# Patient Record
Sex: FEMALE | Race: WHITE | NOT HISPANIC OR LATINO | Employment: FULL TIME | URBAN - METROPOLITAN AREA
[De-identification: names, ages, dates, MRNs, and addresses within clinical notes are randomized per-mention and may not be internally consistent; named-entity substitution may affect disease eponyms.]

---

## 2018-10-24 ENCOUNTER — OFFICE VISIT (OUTPATIENT)
Dept: FAMILY MEDICINE CLINIC | Facility: CLINIC | Age: 23
End: 2018-10-24

## 2018-10-24 VITALS
HEART RATE: 99 BPM | OXYGEN SATURATION: 98 % | TEMPERATURE: 97.6 F | SYSTOLIC BLOOD PRESSURE: 116 MMHG | WEIGHT: 161.8 LBS | HEIGHT: 66 IN | RESPIRATION RATE: 20 BRPM | DIASTOLIC BLOOD PRESSURE: 64 MMHG | BODY MASS INDEX: 26 KG/M2

## 2018-10-24 DIAGNOSIS — R25.1 TREMOR: Primary | ICD-10-CM

## 2018-10-24 PROCEDURE — 99202 OFFICE O/P NEW SF 15 MIN: CPT | Performed by: FAMILY MEDICINE

## 2018-10-24 RX ORDER — CYCLOBENZAPRINE HCL 10 MG
TABLET ORAL
Refills: 0 | COMMUNITY
Start: 2018-07-23 | End: 2018-10-24

## 2018-10-24 RX ORDER — ETONOGESTREL/ETHINYL ESTRADIOL .12-.015MG
RING, VAGINAL VAGINAL
Refills: 11 | COMMUNITY
Start: 2018-08-09 | End: 2018-10-24

## 2018-10-24 NOTE — PROGRESS NOTES
"Denise Kitchen is a 23 y.o. female.     History of Present Illness   The patient is here today with c/o tremor of both hands.    States she  First noticed it about a 4 to 5 years ago. Has worsened over the past year and noticed in the past 2 weeks it has gotten much worse. Having difficulty with writing.  Denies any chest pain or shortness of breath.    Also c/o episodic joint pain in elbows and knees. Described as dull ache      The following portions of the patient's history were reviewed and updated as appropriate: allergies, current medications, past social history and problem list.    Review of Systems   Respiratory: Negative for shortness of breath.    Cardiovascular: Negative for chest pain.   Gastrointestinal: Negative for constipation, diarrhea and nausea.   Musculoskeletal: Positive for arthralgias (knees and elbows).   Neurological: Positive for tremors.       Objective   /64   Pulse 99   Temp 97.6 °F (36.4 °C) (Temporal Artery )   Resp 20   Ht 167.6 cm (66\")   Wt 73.4 kg (161 lb 12.8 oz)   SpO2 98%   BMI 26.12 kg/m²   Physical Exam   Constitutional: She is oriented to person, place, and time. She appears well-developed and well-nourished.   HENT:   Mouth/Throat: Oropharynx is clear and moist.   Eyes: Pupils are equal, round, and reactive to light. Conjunctivae and EOM are normal.   Neck: Normal range of motion. Neck supple. No thyromegaly present.   Cardiovascular: Normal rate and regular rhythm.    Pulmonary/Chest: Effort normal and breath sounds normal.   Neurological: She is alert and oriented to person, place, and time. She displays normal reflexes. No cranial nerve deficit or sensory deficit. She exhibits normal muscle tone. Coordination normal.   Neurologic examination reveals a coarse tremor of both upper extremities.  There is also some very mild tremor of the feet of both lower extremities.  There is some transient and mild cogwheel rigidity noted in the right upper " extremity.  The patient's coordination is normal.  mental status exam is entirely normal.   Nursing note and vitals reviewed.      Assessment/Plan   Problem List Items Addressed This Visit     None      Visit Diagnoses     Tremor    -  Primary    Relevant Orders    Ambulatory Referral to Neurology (Completed)      The patient has a tremor of all 4 extremities that has been present for a few years and recently has grown worse.  The tremor is not affected by intention.  It has begun to interfere with her activities of daily living.  There is no family history of tremor in the patient observes a low caffeine diet.  Physical examination does not suggest hyperthyroidism.  On neuromuscular testing on one occasion I thought I detected some cogwheel rigidity in the right upper extremity.  She is having no other neurologic symptoms.  We will go ahead and refer to neurology.  Check some routine laboratory studies.        Drink plenty fluids.    Check a CBC,CMP,Free T 4 and TSH. Report results by letter.    Refer to Neurology. Prefers Dr Rudi Pollard.    Follow up as needed.              Scribed for Dr Alirio Lanza by Sridevi Islas CMA.          I, Alirio Lanza MD, personally performed the services described in this documentation, as scribed by Sridevi Islas in my presence, and is both accurate and complete.

## 2018-11-26 ENCOUNTER — OFFICE VISIT (OUTPATIENT)
Dept: NEUROLOGY | Facility: CLINIC | Age: 23
End: 2018-11-26

## 2018-11-26 VITALS
HEART RATE: 79 BPM | DIASTOLIC BLOOD PRESSURE: 74 MMHG | SYSTOLIC BLOOD PRESSURE: 116 MMHG | BODY MASS INDEX: 25.88 KG/M2 | HEIGHT: 66 IN | WEIGHT: 161 LBS | OXYGEN SATURATION: 98 %

## 2018-11-26 DIAGNOSIS — R25.1 OCCASIONAL TREMORS: Primary | ICD-10-CM

## 2018-11-26 PROBLEM — F41.9 ANXIETY: Status: ACTIVE | Noted: 2018-11-26

## 2018-11-26 PROCEDURE — 99204 OFFICE O/P NEW MOD 45 MIN: CPT | Performed by: PSYCHIATRY & NEUROLOGY

## 2018-11-26 RX ORDER — PROPRANOLOL HCL 60 MG
60 CAPSULE, EXTENDED RELEASE 24HR ORAL DAILY
Qty: 30 CAPSULE | Refills: 5 | Status: SHIPPED | OUTPATIENT
Start: 2018-11-26 | End: 2020-02-03

## 2018-11-26 NOTE — PROGRESS NOTES
Subjective:    CC: Jessica Kitchen is seen today in consultation at the request of Alirio Lanza MD for Tremors       HPI:  23 year old female accompanied by her mother with a history of anxiety now presents with tremors.  As per patient she has had tremors in her hands for about 7 years now but they have been worsening recently.  She would initially have them at rest but in the last few years they are also present on carrying out tasks such as reaching out for objects and writing.  She is an anxious person and she feels that stress and anxiety seems to make the tremors worse.  As per her mother the tremors completely subside when the patient is relaxed.  There is no family history of tremors other than her grandmother's sister having Parkinson's disease.  She also denies having any other symptoms.    Of note- I reviewed her PCPs note.    The following portions of the patient's history were reviewed today and updated as of 11/26/2018  : allergies, current medications, past family history, past medical history, past social history, past surgical history and problem list  These document will be scanned to patient's chart.      Current Outpatient Medications:   •  levonorgestrel (KYLEENA) 19.5 MG intrauterine device IUD, 1 each by Intrauterine route 1 (One) Time., Disp: , Rfl:   •  propranolol LA (INDERAL LA) 60 MG 24 hr capsule, Take 1 capsule by mouth Daily., Disp: 30 capsule, Rfl: 5   Past Medical History:   Diagnosis Date   • Occasional tremors 11/26/2018      Past Surgical History:   Procedure Laterality Date   • ADENOIDECTOMY     • TONSILLECTOMY     • WISDOM TOOTH EXTRACTION        Family History   Problem Relation Age of Onset   • No Known Problems Mother    • No Known Problems Father       Social History     Socioeconomic History   • Marital status: Single     Spouse name: Not on file   • Number of children: Not on file   • Years of education: Not on file   • Highest education level: Not on file   Social  "Needs   • Financial resource strain: Not on file   • Food insecurity - worry: Not on file   • Food insecurity - inability: Not on file   • Transportation needs - medical: Not on file   • Transportation needs - non-medical: Not on file   Occupational History   • Not on file   Tobacco Use   • Smoking status: Never Smoker   • Smokeless tobacco: Never Used   Substance and Sexual Activity   • Alcohol use: No     Frequency: Never   • Drug use: No   • Sexual activity: Defer   Other Topics Concern   • Not on file   Social History Narrative   • Not on file     Review of Systems   Constitutional: Positive for appetite change, fatigue and unexpected weight loss.   Respiratory: Positive for shortness of breath.    Gastrointestinal: Positive for diarrhea and nausea.   Neurological: Positive for dizziness and tremors.   Psychiatric/Behavioral: Positive for agitation. The patient is nervous/anxious.    All other systems reviewed and are negative.      Objective:    /74 (BP Location: Right arm, Patient Position: Sitting, Cuff Size: Adult)   Pulse 79   Ht 167.6 cm (66\")   Wt 73 kg (161 lb)   SpO2 98%   BMI 25.99 kg/m²     Neurology Exam:    General apperance: NAD.     Mental status: Alert, awake and oriented to time place and person.    Recent and Remote memory: Intact.    Attention span and Concentration: Normal.     Language and Speech: Intact- No dysarthria.    Fluency, Naming , Repitition and Comprehension:  Intact    Cranial Nerves:   CN II: Visual fields are full. Intact. Fundi - Normal, No papillederma, Pupils - GONZALEZ  CN III, IV and VI: Extraocular movements are intact. Normal saccades.   CN V: Facial sensation is intact.   CN VII: Muscles of facial expression reveal no asymmetry. Intact.   CN VIII: Hearing is intact. Whispered voice intact.   CN IX and X: Palate elevates symmetrically. Intact  CN XI: Shoulder shrug is intact.   CN XII: Tongue is midline without evidence of atrophy or fasciculation. "     Ophthalmoscopic exam of optic disc-normal    Motor:-Fine postural more than resting tremors in both hands  Right UE muscle strength 5/5. Normal tone.     Left UE muscle strength 5/5.  Mild cogwheel rigidity noted     Right LE muscle strength5/5. Normal tone.     Left LE muscle strength 5/5. Normal tone.      Sensory: Normal light touch, vibration and pinprick sensation bilaterally.    DTRs: 2+ bilaterally in upper and lower extremities.    Babinski: Negative bilaterally.    Co-ordination: Normal finger-to-nose, heel to shin B/L.    Rhomberg: Negative.    Gait: Normal.  Could do tandem walking    Cardiovascular: Regular rate and rhythm without murmur, gallop or rub.    Assessment and Plan:  1. Occasional tremors  I feel she may have either physiological tremors worsened by anxiety or essential tremors.  I will start her on Inderal LA 60 mg at night.  For her anxiety I have asked her to speak to her PCP about starting her on low dose antidepressants.    - T4, Free; Future  - TSH; Future  - Vitamin B12; Future  - Copper, Serum; Future       Return in about 6 weeks (around 1/7/2019).         Thais Cano MD

## 2018-11-28 LAB
COPPER SERPL-MCNC: 85 UG/DL (ref 72–166)
T4 FREE SERPL-MCNC: 1.26 NG/DL (ref 0.82–1.77)
TSH SERPL DL<=0.005 MIU/L-ACNC: 1.1 UIU/ML (ref 0.45–4.5)
VIT B12 SERPL-MCNC: 349 PG/ML (ref 232–1245)

## 2018-12-03 ENCOUNTER — TELEPHONE (OUTPATIENT)
Dept: NEUROLOGY | Facility: CLINIC | Age: 23
End: 2018-12-03

## 2018-12-03 NOTE — TELEPHONE ENCOUNTER
----- Message from Thais Cano MD sent at 11/30/2018  5:31 PM EST -----  Can you tell her that all her labs were normal.  B12 was on the lower limit and she can take B12 1000 µg daily

## 2018-12-06 NOTE — TELEPHONE ENCOUNTER
Spoke with patient and advised her of lab results. Patient stated that she had no questions at this time

## 2019-07-30 ENCOUNTER — HOSPITAL ENCOUNTER (EMERGENCY)
Facility: HOSPITAL | Age: 24
Discharge: HOME | End: 2019-07-30
Attending: EMERGENCY MEDICINE | Admitting: EMERGENCY MEDICINE

## 2019-07-30 ENCOUNTER — APPOINTMENT (OUTPATIENT)
Dept: GENERAL RADIOLOGY | Facility: HOSPITAL | Age: 24
End: 2019-07-30

## 2019-07-30 VITALS
DIASTOLIC BLOOD PRESSURE: 77 MMHG | HEIGHT: 66 IN | BODY MASS INDEX: 24.11 KG/M2 | HEART RATE: 100 BPM | RESPIRATION RATE: 20 BRPM | TEMPERATURE: 98.5 F | SYSTOLIC BLOOD PRESSURE: 118 MMHG | WEIGHT: 150 LBS | OXYGEN SATURATION: 98 %

## 2019-07-30 DIAGNOSIS — R04.0 EPISTAXIS: Primary | ICD-10-CM

## 2019-07-30 PROCEDURE — 99283 EMERGENCY DEPT VISIT LOW MDM: CPT

## 2019-07-30 RX ORDER — VENLAFAXINE 37.5 MG/1
37.5 TABLET ORAL DAILY
COMMUNITY

## 2020-02-03 RX ORDER — PROPRANOLOL HCL 60 MG
CAPSULE, EXTENDED RELEASE 24HR ORAL
Qty: 30 CAPSULE | Refills: 4 | Status: SHIPPED | OUTPATIENT
Start: 2020-02-03 | End: 2020-03-11

## 2020-03-11 ENCOUNTER — OFFICE VISIT (OUTPATIENT)
Dept: FAMILY MEDICINE CLINIC | Facility: CLINIC | Age: 25
End: 2020-03-11

## 2020-03-11 VITALS
SYSTOLIC BLOOD PRESSURE: 110 MMHG | OXYGEN SATURATION: 96 % | BODY MASS INDEX: 26.52 KG/M2 | HEART RATE: 90 BPM | RESPIRATION RATE: 19 BRPM | TEMPERATURE: 97.9 F | DIASTOLIC BLOOD PRESSURE: 80 MMHG | HEIGHT: 66 IN | WEIGHT: 165 LBS

## 2020-03-11 DIAGNOSIS — J30.1 SEASONAL ALLERGIC RHINITIS DUE TO POLLEN: ICD-10-CM

## 2020-03-11 DIAGNOSIS — J01.00 ACUTE MAXILLARY SINUSITIS, RECURRENCE NOT SPECIFIED: Primary | ICD-10-CM

## 2020-03-11 PROCEDURE — 99213 OFFICE O/P EST LOW 20 MIN: CPT | Performed by: FAMILY MEDICINE

## 2020-03-11 RX ORDER — FLUTICASONE PROPIONATE 50 MCG
SPRAY, SUSPENSION (ML) NASAL
Qty: 1 BOTTLE | Refills: 5 | Status: SHIPPED | OUTPATIENT
Start: 2020-03-11

## 2020-03-11 RX ORDER — CEFUROXIME AXETIL 250 MG/1
250 TABLET ORAL 2 TIMES DAILY
Qty: 20 TABLET | Refills: 0 | Status: SHIPPED | OUTPATIENT
Start: 2020-03-11 | End: 2020-05-26

## 2020-03-11 RX ORDER — CETIRIZINE HYDROCHLORIDE 10 MG/1
10 TABLET ORAL DAILY
Qty: 30 TABLET | Refills: 2 | Status: SHIPPED | OUTPATIENT
Start: 2020-03-11

## 2020-03-11 NOTE — PROGRESS NOTES
"Denise Kitchen is a 24 y.o. female seen today for URI.     Cough   This is a new problem. The current episode started in the past 7 days. The cough is productive of sputum. Associated symptoms include postnasal drip. Pertinent negatives include no chest pain, chills, fever, shortness of breath or wheezing. Nothing aggravates the symptoms.        The following portions of the patient's history were reviewed and updated as appropriate: allergies, current medications, past social history and problem list.    Review of Systems   Constitutional: Negative for chills and fever.   HENT: Positive for postnasal drip, sinus pressure and sinus pain.    Respiratory: Positive for cough. Negative for shortness of breath and wheezing.    Cardiovascular: Negative for chest pain.       Objective   /80   Pulse 90   Temp 97.9 °F (36.6 °C)   Resp 19   Ht 167.6 cm (66\")   Wt 74.8 kg (165 lb)   SpO2 96%   BMI 26.63 kg/m²   Physical Exam   Constitutional: She appears well-developed and well-nourished.   HENT:   Right Ear: External ear normal.   Left Ear: External ear normal.   Nose: Right sinus exhibits maxillary sinus tenderness. Right sinus exhibits no frontal sinus tenderness. Left sinus exhibits maxillary sinus tenderness. Left sinus exhibits no frontal sinus tenderness.   Mouth/Throat: Oropharynx is clear and moist.   Cardiovascular: Normal rate and regular rhythm.   Pulmonary/Chest: Effort normal and breath sounds normal.   Nursing note and vitals reviewed.      Assessment/Plan   Problem List Items Addressed This Visit     None      Visit Diagnoses     Acute maxillary sinusitis, recurrence not specified    -  Primary    Seasonal allergic rhinitis due to pollen                  Drink plenty fluids.    Cefuroxime 250 mg twice a day #20+0.  Rx for Fluticasone nasal spray 2 sprays in each nostril daily.#1+5  Rx for Cetirizine 10 mg daily #30+2.    Follow up as needed.                Scribed for Dr Alirio Lanza " by Sridevi Islas CMA.          I, Alirio Lanza MD, personally performed the services described in this documentation, as scribed by Sridevi Islas in my presence, and is both accurate and complete.        (Please note that portions of this note were completed with a voice recognition program. Efforts were made to edit the dictations,but occasionally words are mis transcribed.)

## 2020-05-25 ENCOUNTER — HOSPITAL ENCOUNTER (EMERGENCY)
Facility: HOSPITAL | Age: 25
Discharge: LEFT BEFORE DISCHARGE | End: 2020-05-25

## 2020-05-25 VITALS
TEMPERATURE: 97.6 F | RESPIRATION RATE: 16 BRPM | HEIGHT: 66 IN | DIASTOLIC BLOOD PRESSURE: 92 MMHG | WEIGHT: 155 LBS | OXYGEN SATURATION: 98 % | SYSTOLIC BLOOD PRESSURE: 129 MMHG | BODY MASS INDEX: 24.91 KG/M2 | HEART RATE: 112 BPM

## 2020-05-26 ENCOUNTER — APPOINTMENT (OUTPATIENT)
Dept: CT IMAGING | Facility: HOSPITAL | Age: 25
End: 2020-05-26

## 2020-05-26 ENCOUNTER — HOSPITAL ENCOUNTER (EMERGENCY)
Facility: HOSPITAL | Age: 25
Discharge: HOME | End: 2020-05-26
Attending: EMERGENCY MEDICINE | Admitting: EMERGENCY MEDICINE

## 2020-05-26 VITALS
HEART RATE: 96 BPM | RESPIRATION RATE: 18 BRPM | DIASTOLIC BLOOD PRESSURE: 82 MMHG | OXYGEN SATURATION: 95 % | HEIGHT: 66 IN | WEIGHT: 155 LBS | BODY MASS INDEX: 24.91 KG/M2 | TEMPERATURE: 98.2 F | SYSTOLIC BLOOD PRESSURE: 114 MMHG

## 2020-05-26 DIAGNOSIS — S09.90XA MINOR HEAD INJURY, INITIAL ENCOUNTER: Primary | ICD-10-CM

## 2020-05-26 LAB
ALBUMIN SERPL-MCNC: 4.9 G/DL (ref 3.5–5.2)
ALBUMIN/GLOB SERPL: 2.5 G/DL
ALP SERPL-CCNC: 65 U/L (ref 39–117)
ALT SERPL W P-5'-P-CCNC: 14 U/L (ref 1–33)
ANION GAP SERPL CALCULATED.3IONS-SCNC: 12 MMOL/L (ref 5–15)
AST SERPL-CCNC: 18 U/L (ref 1–32)
B-HCG UR QL: NEGATIVE
BACTERIA UR QL AUTO: ABNORMAL /HPF
BASOPHILS # BLD AUTO: 0.02 10*3/MM3 (ref 0–0.2)
BASOPHILS NFR BLD AUTO: 0.5 % (ref 0–1.5)
BILIRUB SERPL-MCNC: 0.7 MG/DL (ref 0.2–1.2)
BILIRUB UR QL STRIP: NEGATIVE
BUN BLD-MCNC: 6 MG/DL (ref 6–20)
BUN/CREAT SERPL: 8.5 (ref 7–25)
CALCIUM SPEC-SCNC: 9.3 MG/DL (ref 8.6–10.5)
CHLORIDE SERPL-SCNC: 103 MMOL/L (ref 98–107)
CLARITY UR: ABNORMAL
CO2 SERPL-SCNC: 26 MMOL/L (ref 22–29)
COLOR UR: YELLOW
CREAT BLD-MCNC: 0.71 MG/DL (ref 0.57–1)
DEPRECATED RDW RBC AUTO: 41.3 FL (ref 37–54)
EOSINOPHIL # BLD AUTO: 0.04 10*3/MM3 (ref 0–0.4)
EOSINOPHIL NFR BLD AUTO: 1 % (ref 0.3–6.2)
ERYTHROCYTE [DISTWIDTH] IN BLOOD BY AUTOMATED COUNT: 11.6 % (ref 12.3–15.4)
GFR SERPL CREATININE-BSD FRML MDRD: 102 ML/MIN/1.73
GLOBULIN UR ELPH-MCNC: 2 GM/DL
GLUCOSE BLD-MCNC: 88 MG/DL (ref 65–99)
GLUCOSE UR STRIP-MCNC: NEGATIVE MG/DL
HCT VFR BLD AUTO: 45.6 % (ref 34–46.6)
HGB BLD-MCNC: 15.5 G/DL (ref 12–15.9)
HGB UR QL STRIP.AUTO: ABNORMAL
HYALINE CASTS UR QL AUTO: ABNORMAL /LPF
IMM GRANULOCYTES # BLD AUTO: 0.01 10*3/MM3 (ref 0–0.05)
IMM GRANULOCYTES NFR BLD AUTO: 0.3 % (ref 0–0.5)
KETONES UR QL STRIP: NEGATIVE
LEUKOCYTE ESTERASE UR QL STRIP.AUTO: ABNORMAL
LYMPHOCYTES # BLD AUTO: 1.24 10*3/MM3 (ref 0.7–3.1)
LYMPHOCYTES NFR BLD AUTO: 31.1 % (ref 19.6–45.3)
MCH RBC QN AUTO: 33.1 PG (ref 26.6–33)
MCHC RBC AUTO-ENTMCNC: 34 G/DL (ref 31.5–35.7)
MCV RBC AUTO: 97.4 FL (ref 79–97)
MONOCYTES # BLD AUTO: 0.29 10*3/MM3 (ref 0.1–0.9)
MONOCYTES NFR BLD AUTO: 7.3 % (ref 5–12)
NEUTROPHILS # BLD AUTO: 2.39 10*3/MM3 (ref 1.7–7)
NEUTROPHILS NFR BLD AUTO: 59.8 % (ref 42.7–76)
NITRITE UR QL STRIP: NEGATIVE
NRBC BLD AUTO-RTO: 0 /100 WBC (ref 0–0.2)
PH UR STRIP.AUTO: 7.5 [PH] (ref 5–8)
PLATELET # BLD AUTO: 224 10*3/MM3 (ref 140–450)
PMV BLD AUTO: 9.9 FL (ref 6–12)
POTASSIUM BLD-SCNC: 3.8 MMOL/L (ref 3.5–5.2)
PROT SERPL-MCNC: 6.9 G/DL (ref 6–8.5)
PROT UR QL STRIP: NEGATIVE
RBC # BLD AUTO: 4.68 10*6/MM3 (ref 3.77–5.28)
RBC # UR: ABNORMAL /HPF
REF LAB TEST METHOD: ABNORMAL
SODIUM BLD-SCNC: 141 MMOL/L (ref 136–145)
SP GR UR STRIP: 1.01 (ref 1–1.03)
SQUAMOUS #/AREA URNS HPF: ABNORMAL /HPF
TROPONIN T SERPL-MCNC: <0.01 NG/ML (ref 0–0.03)
UROBILINOGEN UR QL STRIP: ABNORMAL
WBC NRBC COR # BLD: 3.99 10*3/MM3 (ref 3.4–10.8)
WBC UR QL AUTO: ABNORMAL /HPF
YEAST URNS QL MICRO: ABNORMAL /HPF

## 2020-05-26 PROCEDURE — 70450 CT HEAD/BRAIN W/O DYE: CPT

## 2020-05-26 PROCEDURE — 80053 COMPREHEN METABOLIC PANEL: CPT | Performed by: NURSE PRACTITIONER

## 2020-05-26 PROCEDURE — 81025 URINE PREGNANCY TEST: CPT | Performed by: NURSE PRACTITIONER

## 2020-05-26 PROCEDURE — 85025 COMPLETE CBC W/AUTO DIFF WBC: CPT | Performed by: NURSE PRACTITIONER

## 2020-05-26 PROCEDURE — 87086 URINE CULTURE/COLONY COUNT: CPT | Performed by: NURSE PRACTITIONER

## 2020-05-26 PROCEDURE — 99284 EMERGENCY DEPT VISIT MOD MDM: CPT

## 2020-05-26 PROCEDURE — 93005 ELECTROCARDIOGRAM TRACING: CPT | Performed by: NURSE PRACTITIONER

## 2020-05-26 PROCEDURE — 81001 URINALYSIS AUTO W/SCOPE: CPT | Performed by: NURSE PRACTITIONER

## 2020-05-26 PROCEDURE — 84484 ASSAY OF TROPONIN QUANT: CPT | Performed by: NURSE PRACTITIONER

## 2020-05-26 RX ORDER — SODIUM CHLORIDE 0.9 % (FLUSH) 0.9 %
10 SYRINGE (ML) INJECTION AS NEEDED
Status: DISCONTINUED | OUTPATIENT
Start: 2020-05-26 | End: 2020-05-26 | Stop reason: HOSPADM

## 2020-05-26 RX ORDER — MELATONIN
1000 DAILY
COMMUNITY

## 2020-05-26 RX ORDER — BUSPIRONE HYDROCHLORIDE 5 MG/1
5 TABLET ORAL 2 TIMES DAILY
COMMUNITY

## 2020-05-26 RX ORDER — CLONAZEPAM 0.5 MG/1
0.5 TABLET ORAL 2 TIMES DAILY PRN
COMMUNITY

## 2020-05-26 RX ADMIN — SODIUM CHLORIDE 1000 ML: 9 INJECTION, SOLUTION INTRAVENOUS at 11:54

## 2020-05-28 LAB — BACTERIA SPEC AEROBE CULT: NO GROWTH

## 2020-09-10 ENCOUNTER — TELEPHONE (OUTPATIENT)
Dept: PEDIATRICS | Facility: OTHER | Age: 25
End: 2020-09-10

## 2020-09-10 ENCOUNTER — OFFICE VISIT (OUTPATIENT)
Dept: FAMILY MEDICINE CLINIC | Facility: CLINIC | Age: 25
End: 2020-09-10

## 2020-09-10 VITALS
RESPIRATION RATE: 16 BRPM | WEIGHT: 151 LBS | DIASTOLIC BLOOD PRESSURE: 68 MMHG | HEART RATE: 100 BPM | SYSTOLIC BLOOD PRESSURE: 110 MMHG | TEMPERATURE: 98.2 F | HEIGHT: 66 IN | BODY MASS INDEX: 24.27 KG/M2 | OXYGEN SATURATION: 99 %

## 2020-09-10 DIAGNOSIS — J02.9 PHARYNGITIS, UNSPECIFIED ETIOLOGY: Primary | ICD-10-CM

## 2020-09-10 DIAGNOSIS — M62.838 MUSCLE SPASM: ICD-10-CM

## 2020-09-10 PROCEDURE — 86664 EPSTEIN-BARR NUCLEAR ANTIGEN: CPT | Performed by: NURSE PRACTITIONER

## 2020-09-10 PROCEDURE — 99213 OFFICE O/P EST LOW 20 MIN: CPT | Performed by: NURSE PRACTITIONER

## 2020-09-10 PROCEDURE — 85025 COMPLETE CBC W/AUTO DIFF WBC: CPT | Performed by: NURSE PRACTITIONER

## 2020-09-10 PROCEDURE — 86665 EPSTEIN-BARR CAPSID VCA: CPT | Performed by: NURSE PRACTITIONER

## 2020-09-10 PROCEDURE — 85007 BL SMEAR W/DIFF WBC COUNT: CPT | Performed by: NURSE PRACTITIONER

## 2020-09-10 PROCEDURE — 80053 COMPREHEN METABOLIC PANEL: CPT | Performed by: NURSE PRACTITIONER

## 2020-09-10 RX ORDER — CYCLOBENZAPRINE HCL 10 MG
10 TABLET ORAL 3 TIMES DAILY PRN
Qty: 60 TABLET | Refills: 0 | Status: SHIPPED | OUTPATIENT
Start: 2020-09-10 | End: 2021-02-04

## 2020-09-10 RX ORDER — AZITHROMYCIN 250 MG/1
TABLET, FILM COATED ORAL
Qty: 6 TABLET | Refills: 0 | Status: SHIPPED | OUTPATIENT
Start: 2020-09-10 | End: 2020-09-14

## 2020-09-10 NOTE — PROGRESS NOTES
Denise Kitchen is a 25 y.o. female.     History of Present Illness  9/9/20 tested neg for covid.  9/6/20 started with bilat ear pain, sore throat, neck tightness, fever, myalgia. No other tests were performed. Fever up to 100.1. Last temp was 9/9/20. Slight sore throat, doesn't hurt to swallow but neck feels tight. No wheezing or trouble getting a breath.  Has had strep in the past and was worse than this. Has had these symptoms in the past and though to be a virus. Was exposed to covid positive person Aug 30th. Was re-tested for covid today. Results pending.    Outpatient Encounter Medications as of 9/10/2020   Medication Sig Dispense Refill   • busPIRone (BUSPAR) 5 MG tablet Take 5 mg by mouth 2 (Two) Times a Day.     • cetirizine (zyrTEC) 10 MG tablet Take 1 tablet by mouth Daily. 30 tablet 2   • cholecalciferol (VITAMIN D3) 25 MCG (1000 UT) tablet Take 1,000 Units by mouth Daily.     • clonazePAM (KlonoPIN) 0.5 MG tablet Take 0.5 mg by mouth 2 (Two) Times a Day As Needed for Seizures.     • fluticasone (FLONASE) 50 MCG/ACT nasal spray Administer 2 sprays in each nostril daily. 1 bottle 5   • levonorgestrel (KYLEENA) 19.5 MG intrauterine device IUD 1 each by Intrauterine route 1 (One) Time.     • venlafaxine (EFFEXOR) 37.5 MG tablet Take 37.5 mg by mouth Daily.       No facility-administered encounter medications on file as of 9/10/2020.        The following portions of the patient's history were reviewed and updated as appropriate: allergies, current medications, past family history, past medical history, past social history, past surgical history and problem list.    Review of Systems   Constitutional: Positive for fever. Negative for appetite change, unexpected weight gain and unexpected weight loss.   HENT: Positive for ear pain and sore throat. Negative for congestion, nosebleeds and trouble swallowing.    Eyes: Negative for visual disturbance.   Respiratory: Negative for cough, shortness of  "breath and wheezing.    Cardiovascular: Negative for chest pain, palpitations and leg swelling.   Gastrointestinal: Negative for abdominal pain, blood in stool, constipation, diarrhea, nausea and vomiting.   Endocrine: Negative for polydipsia, polyphagia and polyuria.   Genitourinary: Negative for dysuria, frequency and hematuria.   Musculoskeletal: Positive for myalgias and neck pain. Negative for arthralgias and joint swelling.   Skin: Negative for rash.   Neurological: Negative for dizziness, seizures, syncope and numbness.   Hematological: Negative for adenopathy. Does not bruise/bleed easily.   Psychiatric/Behavioral: Negative for behavioral problems, sleep disturbance and depressed mood. The patient is not nervous/anxious.        Objective     Visit Vitals  /100   Pulse 100   Temp 98.2 °F (36.8 °C)   Resp 16   Ht 167.6 cm (66\")   Wt 68.5 kg (151 lb)   LMP  (Within Weeks)   SpO2 99%   BMI 24.37 kg/m²       Physical Exam   Constitutional: She is oriented to person, place, and time. She appears well-developed and well-nourished. No distress.   HENT:   Head: Normocephalic and atraumatic.   Right Ear: Tympanic membrane and external ear normal.   Left Ear: Tympanic membrane and external ear normal.   Eyes: Pupils are equal, round, and reactive to light. Conjunctivae are normal. Right eye exhibits no discharge. Left eye exhibits no discharge. No scleral icterus.   Neck: Normal range of motion. Neck supple. No JVD present. No tracheal deviation present. No thyromegaly present.   Cardiovascular: Normal rate, regular rhythm and normal heart sounds. Exam reveals no gallop and no friction rub.   No murmur heard.  Pulmonary/Chest: Effort normal and breath sounds normal. No respiratory distress. She has no wheezes.   Abdominal: Soft. Bowel sounds are normal. She exhibits no distension and no mass. There is no tenderness.   Musculoskeletal: She exhibits tenderness. She exhibits no edema or deformity.   Traps are tight " bilat   Lymphadenopathy:     She has no cervical adenopathy.   Neurological: She is alert and oriented to person, place, and time. Coordination normal.   Skin: Skin is warm and dry. Capillary refill takes less than 2 seconds. No rash noted. No erythema.   Psychiatric: She has a normal mood and affect. Her speech is normal and behavior is normal. Judgment and thought content normal.   Nursing note and vitals reviewed.        Assessment/Plan   Jessica was seen today for earache.    Diagnoses and all orders for this visit:    Pharyngitis, unspecified etiology  -     azithromycin (ZITHROMAX) 250 MG tablet; Take 2 tablets the first day, then 1 tablet daily for 4 days.  -     CBC & Differential  -     Comprehensive Metabolic Panel  -     EBV Antibody Profile  -     CBC Auto Differential    Muscle spasm  -     cyclobenzaprine (FLEXERIL) 10 MG tablet; Take 1 tablet by mouth 3 (Three) Times a Day As Needed for Muscle Spasms.    We discussed her options.  She has low grade fever < 100.4, her neck feels tight, not necessarily her throat. She has no signs of meningitis. She is not drooling or having dysphagia.  Discussed she would need to go to [facility] for rss and flu test but she doesn't want to go there due to large copay.  Will go ahead and treat with z-pack and check labs.  This is likely viral. Discussed that if she gets fever> 100.4, cough, worsening sore throat she needs to go to ER/UTC.   She verbalized understanding.

## 2020-09-11 ENCOUNTER — TELEPHONE (OUTPATIENT)
Dept: FAMILY MEDICINE CLINIC | Facility: CLINIC | Age: 25
End: 2020-09-11

## 2020-09-11 ENCOUNTER — LAB (OUTPATIENT)
Dept: FAMILY MEDICINE CLINIC | Facility: CLINIC | Age: 25
End: 2020-09-11

## 2020-09-11 DIAGNOSIS — D72.819 LEUKOPENIA, UNSPECIFIED TYPE: Primary | ICD-10-CM

## 2020-09-11 DIAGNOSIS — R74.8 ELEVATED LIVER ENZYMES: ICD-10-CM

## 2020-09-11 DIAGNOSIS — D72.819 LEUKOPENIA, UNSPECIFIED TYPE: ICD-10-CM

## 2020-09-11 DIAGNOSIS — R06.02 SHORTNESS OF BREATH: ICD-10-CM

## 2020-09-11 DIAGNOSIS — M54.2 NECK PAIN: ICD-10-CM

## 2020-09-11 LAB
ALBUMIN SERPL-MCNC: 4.4 G/DL (ref 3.5–5.2)
ALBUMIN/GLOB SERPL: 2.2 G/DL
ALP SERPL-CCNC: 154 U/L (ref 39–117)
ALT SERPL W P-5'-P-CCNC: 210 U/L (ref 1–33)
ANION GAP SERPL CALCULATED.3IONS-SCNC: 10.7 MMOL/L (ref 5–15)
AST SERPL-CCNC: 169 U/L (ref 1–32)
BILIRUB SERPL-MCNC: 0.6 MG/DL (ref 0–1.2)
BUN SERPL-MCNC: 6 MG/DL (ref 6–20)
BUN/CREAT SERPL: 10.5 (ref 7–25)
CALCIUM SPEC-SCNC: 9.3 MG/DL (ref 8.6–10.5)
CHLORIDE SERPL-SCNC: 103 MMOL/L (ref 98–107)
CO2 SERPL-SCNC: 22.3 MMOL/L (ref 22–29)
CREAT SERPL-MCNC: 0.57 MG/DL (ref 0.57–1)
DEPRECATED RDW RBC AUTO: 42.9 FL (ref 37–54)
ERYTHROCYTE [DISTWIDTH] IN BLOOD BY AUTOMATED COUNT: 11.6 % (ref 12.3–15.4)
GFR SERPL CREATININE-BSD FRML MDRD: 130 ML/MIN/1.73
GLOBULIN UR ELPH-MCNC: 2 GM/DL
GLUCOSE SERPL-MCNC: 96 MG/DL (ref 65–99)
HCT VFR BLD AUTO: 43.5 % (ref 34–46.6)
HGB BLD-MCNC: 15.2 G/DL (ref 12–15.9)
HOLD SPECIMEN: NORMAL
LYMPHOCYTES # BLD MANUAL: 0.49 10*3/MM3 (ref 0.7–3.1)
LYMPHOCYTES NFR BLD MANUAL: 27.3 % (ref 19.6–45.3)
LYMPHOCYTES NFR BLD MANUAL: 3 % (ref 5–12)
MCH RBC QN AUTO: 34.5 PG (ref 26.6–33)
MCHC RBC AUTO-ENTMCNC: 34.9 G/DL (ref 31.5–35.7)
MCV RBC AUTO: 98.9 FL (ref 79–97)
MONOCYTES # BLD AUTO: 0.05 10*3/MM3 (ref 0.1–0.9)
NEUTROPHILS # BLD AUTO: 1.26 10*3/MM3 (ref 1.7–7)
NEUTROPHILS NFR BLD MANUAL: 69.7 % (ref 42.7–76)
PLAT MORPH BLD: NORMAL
PLATELET # BLD AUTO: 126 10*3/MM3 (ref 140–450)
PMV BLD AUTO: 10.8 FL (ref 6–12)
POTASSIUM SERPL-SCNC: 3.8 MMOL/L (ref 3.5–5.2)
PROT SERPL-MCNC: 6.4 G/DL (ref 6–8.5)
RBC # BLD AUTO: 4.4 10*6/MM3 (ref 3.77–5.28)
RBC MORPH BLD: NORMAL
SODIUM SERPL-SCNC: 136 MMOL/L (ref 136–145)
WBC # BLD AUTO: 1.81 10*3/MM3 (ref 3.4–10.8)
WBC MORPH BLD: NORMAL

## 2020-09-11 PROCEDURE — 85025 COMPLETE CBC W/AUTO DIFF WBC: CPT | Performed by: NURSE PRACTITIONER

## 2020-09-11 PROCEDURE — 80053 COMPREHEN METABOLIC PANEL: CPT | Performed by: NURSE PRACTITIONER

## 2020-09-11 PROCEDURE — 85007 BL SMEAR W/DIFF WBC COUNT: CPT

## 2020-09-11 PROCEDURE — 80074 ACUTE HEPATITIS PANEL: CPT | Performed by: NURSE PRACTITIONER

## 2020-09-11 NOTE — TELEPHONE ENCOUNTER
I was finally able to reach the patient. Informed of plan. Trying to get Ct pre-certified at this time.

## 2020-09-11 NOTE — TELEPHONE ENCOUNTER
Rec'd notification of critical lab. WBC 1.8 Mono 3.0, abs neut 1.26, lymph 0.49, plt 126. Alt 210, , alk phos 154.  Labs discussed with Dr Lanza. He recommended CT of neck, chest  and urgent referral to Hematology. Repeat CBC with hepatitis panel and CMP on Monday. She needs to stay home and away from other people for her own protection.   Left several messages for patient. Also called her emergency contact number of her mother and left a message asking her to call us back as well.

## 2020-09-12 LAB
ALBUMIN SERPL-MCNC: 3.8 G/DL (ref 3.5–5.2)
ALBUMIN/GLOB SERPL: 1.1 G/DL
ALP SERPL-CCNC: 174 U/L (ref 39–117)
ALT SERPL W P-5'-P-CCNC: 230 U/L (ref 1–33)
ANION GAP SERPL CALCULATED.3IONS-SCNC: 9.4 MMOL/L (ref 5–15)
AST SERPL-CCNC: 209 U/L (ref 1–32)
BILIRUB SERPL-MCNC: 0.9 MG/DL (ref 0–1.2)
BUN SERPL-MCNC: 7 MG/DL (ref 6–20)
BUN/CREAT SERPL: 9.9 (ref 7–25)
CALCIUM SPEC-SCNC: 9.9 MG/DL (ref 8.6–10.5)
CHLORIDE SERPL-SCNC: 103 MMOL/L (ref 98–107)
CO2 SERPL-SCNC: 26.6 MMOL/L (ref 22–29)
CREAT SERPL-MCNC: 0.71 MG/DL (ref 0.57–1)
DEPRECATED RDW RBC AUTO: 40.7 FL (ref 37–54)
EBV NA IGG SER IA-ACNC: <18 U/ML (ref 0–17.9)
EBV VCA IGG SER-ACNC: <18 U/ML (ref 0–17.9)
EBV VCA IGM SER-ACNC: <36 U/ML (ref 0–35.9)
ERYTHROCYTE [DISTWIDTH] IN BLOOD BY AUTOMATED COUNT: 11.4 % (ref 12.3–15.4)
GFR SERPL CREATININE-BSD FRML MDRD: 101 ML/MIN/1.73
GLOBULIN UR ELPH-MCNC: 3.6 GM/DL
GLUCOSE SERPL-MCNC: 90 MG/DL (ref 65–99)
HAV IGM SERPL QL IA: NORMAL
HBV CORE IGM SERPL QL IA: NORMAL
HBV SURFACE AG SERPL QL IA: NORMAL
HCT VFR BLD AUTO: 45 % (ref 34–46.6)
HCV AB SER DONR QL: NORMAL
HGB BLD-MCNC: 15.8 G/DL (ref 12–15.9)
INTERPRETATION: NORMAL
LYMPHOCYTES # BLD MANUAL: 1.18 10*3/MM3 (ref 0.7–3.1)
LYMPHOCYTES NFR BLD MANUAL: 51 % (ref 19.6–45.3)
LYMPHOCYTES NFR BLD MANUAL: 7.3 % (ref 5–12)
MCH RBC QN AUTO: 34.1 PG (ref 26.6–33)
MCHC RBC AUTO-ENTMCNC: 35.1 G/DL (ref 31.5–35.7)
MCV RBC AUTO: 97.2 FL (ref 79–97)
MONOCYTES # BLD AUTO: 0.17 10*3/MM3 (ref 0.1–0.9)
NEUTROPHILS # BLD AUTO: 0.87 10*3/MM3 (ref 1.7–7)
NEUTROPHILS NFR BLD MANUAL: 37.5 % (ref 42.7–76)
PLAT MORPH BLD: NORMAL
PLATELET # BLD AUTO: 121 10*3/MM3 (ref 140–450)
PMV BLD AUTO: 11.2 FL (ref 6–12)
POIKILOCYTOSIS BLD QL SMEAR: ABNORMAL
POTASSIUM SERPL-SCNC: 4.9 MMOL/L (ref 3.5–5.2)
PROT SERPL-MCNC: 7.4 G/DL (ref 6–8.5)
RBC # BLD AUTO: 4.63 10*6/MM3 (ref 3.77–5.28)
SODIUM SERPL-SCNC: 139 MMOL/L (ref 136–145)
VARIANT LYMPHS NFR BLD MANUAL: 4.2 % (ref 0–5)
WBC # BLD AUTO: 2.32 10*3/MM3 (ref 3.4–10.8)
WBC MORPH BLD: NORMAL

## 2020-09-14 ENCOUNTER — CONSULT (OUTPATIENT)
Dept: ONCOLOGY | Facility: CLINIC | Age: 25
End: 2020-09-14

## 2020-09-14 ENCOUNTER — TELEPHONE (OUTPATIENT)
Dept: ONCOLOGY | Facility: CLINIC | Age: 25
End: 2020-09-14

## 2020-09-14 ENCOUNTER — LAB (OUTPATIENT)
Dept: LAB | Facility: HOSPITAL | Age: 25
End: 2020-09-14

## 2020-09-14 VITALS
DIASTOLIC BLOOD PRESSURE: 79 MMHG | HEIGHT: 66 IN | SYSTOLIC BLOOD PRESSURE: 127 MMHG | BODY MASS INDEX: 24.75 KG/M2 | RESPIRATION RATE: 16 BRPM | WEIGHT: 154 LBS | TEMPERATURE: 97.7 F | HEART RATE: 104 BPM | OXYGEN SATURATION: 96 %

## 2020-09-14 DIAGNOSIS — D61.818 PANCYTOPENIA (HCC): Primary | ICD-10-CM

## 2020-09-14 LAB
CHROMATIN AB SERPL-ACNC: <10 IU/ML (ref 0–14)
CRP SERPL-MCNC: 1.89 MG/DL (ref 0–0.5)
ERYTHROCYTE [DISTWIDTH] IN BLOOD BY AUTOMATED COUNT: 11.9 % (ref 12.3–15.4)
ERYTHROCYTE [SEDIMENTATION RATE] IN BLOOD: 7 MM/HR (ref 0–20)
FERRITIN SERPL-MCNC: 1373 NG/ML (ref 13–150)
FOLATE SERPL-MCNC: 11.8 NG/ML (ref 4.78–24.2)
HCT VFR BLD AUTO: 40.9 % (ref 34–46.6)
HGB BLD-MCNC: 14.1 G/DL (ref 12–15.9)
IRON 24H UR-MRATE: 106 MCG/DL (ref 37–145)
IRON SATN MFR SERPL: 27 % (ref 20–50)
LYMPHOCYTES # BLD AUTO: 1.7 10*3/MM3 (ref 0.7–3.1)
LYMPHOCYTES NFR BLD AUTO: 53.3 % (ref 19.6–45.3)
MCH RBC QN AUTO: 33.5 PG (ref 26.6–33)
MCHC RBC AUTO-ENTMCNC: 34.3 G/DL (ref 31.5–35.7)
MCV RBC AUTO: 97.5 FL (ref 79–97)
MONOCYTES # BLD AUTO: 0.4 10*3/MM3 (ref 0.1–0.9)
MONOCYTES NFR BLD AUTO: 13.1 % (ref 5–12)
NEUTROPHILS NFR BLD AUTO: 1.1 10*3/MM3 (ref 1.7–7)
NEUTROPHILS NFR BLD AUTO: 33.6 % (ref 42.7–76)
PLATELET # BLD AUTO: 159 10*3/MM3 (ref 140–450)
PMV BLD AUTO: 7.5 FL (ref 6–12)
RBC # BLD AUTO: 4.2 10*6/MM3 (ref 3.77–5.28)
RETICS # AUTO: 0.06 10*6/MM3 (ref 0.02–0.13)
RETICS/RBC NFR AUTO: 1.47 % (ref 0.7–1.9)
TIBC SERPL-MCNC: 386 MCG/DL (ref 298–536)
TRANSFERRIN SERPL-MCNC: 259 MG/DL (ref 200–360)
VIT B12 BLD-MCNC: >2000 PG/ML (ref 211–946)
WBC # BLD AUTO: 3.2 10*3/MM3 (ref 3.4–10.8)

## 2020-09-14 PROCEDURE — 36415 COLL VENOUS BLD VENIPUNCTURE: CPT | Performed by: INTERNAL MEDICINE

## 2020-09-14 PROCEDURE — 99204 OFFICE O/P NEW MOD 45 MIN: CPT | Performed by: INTERNAL MEDICINE

## 2020-09-14 PROCEDURE — 83540 ASSAY OF IRON: CPT | Performed by: INTERNAL MEDICINE

## 2020-09-14 PROCEDURE — 85025 COMPLETE CBC W/AUTO DIFF WBC: CPT | Performed by: INTERNAL MEDICINE

## 2020-09-14 PROCEDURE — 84466 ASSAY OF TRANSFERRIN: CPT | Performed by: INTERNAL MEDICINE

## 2020-09-14 PROCEDURE — 82607 VITAMIN B-12: CPT | Performed by: INTERNAL MEDICINE

## 2020-09-14 PROCEDURE — 85652 RBC SED RATE AUTOMATED: CPT | Performed by: INTERNAL MEDICINE

## 2020-09-14 PROCEDURE — 82728 ASSAY OF FERRITIN: CPT | Performed by: INTERNAL MEDICINE

## 2020-09-14 PROCEDURE — 86038 ANTINUCLEAR ANTIBODIES: CPT | Performed by: INTERNAL MEDICINE

## 2020-09-14 PROCEDURE — 86140 C-REACTIVE PROTEIN: CPT | Performed by: INTERNAL MEDICINE

## 2020-09-14 PROCEDURE — 86431 RHEUMATOID FACTOR QUANT: CPT | Performed by: INTERNAL MEDICINE

## 2020-09-14 PROCEDURE — 82746 ASSAY OF FOLIC ACID SERUM: CPT | Performed by: INTERNAL MEDICINE

## 2020-09-14 PROCEDURE — 85045 AUTOMATED RETICULOCYTE COUNT: CPT | Performed by: INTERNAL MEDICINE

## 2020-09-14 NOTE — PROGRESS NOTES
DATE OF CONSULTATION: 9/14/2020    REFERRING PHYSICIAN: Suni Hernandez MD    Dear Dr. Hernandez, Suni Jessica MD  Thank you for asking for my medical advice on this patient. I saw her in the  [place] office on 9/14/2020    REASON FOR CONSULTATION: Pancytopenia    HISTORY OF PRESENT ILLNESS: The patient is a very pleasant 25 y.o.  female   who was in her usual state of health until 1 week ago patient presented with bilateral ear pain as well as nasal congestion.  This has been getting gradually worse.  Associated with neck pain shoulder pain and fatigue.  She has similar episodes in the past.  She had low-grade temperature.  Denied recent travel or ill contacts.  Symptoms not improve with over-the-counter medicine.  She went get tested for COVID twice both came back negative.  She saw her primary care provider had a blood work done that revealed pancytopenia with elevated liver enzymes.  This was checked in 2 separate occasions and both confirm the same abnormalities.  She had negative hepatitis profile.  The patient was referred to me for further recommendations.    SUBJECTIVE: When I saw the patient today she is here with her mother (defer that she felt better.  Her pain is resolving as well as of congestion.  No fever or chills today.  According to patient she had similar episodes 2 times before.  Both lasted for about the same time.  Both resolved with best supportive care only.    Review of Systems   Constitutional: Negative for activity change, appetite change, chills, fatigue, fever and unexpected weight change.   HENT: Negative for hearing loss, mouth sores, nosebleeds, sore throat and trouble swallowing.    Eyes: Negative for visual disturbance.   Respiratory: Negative for cough, chest tightness, shortness of breath and wheezing.    Cardiovascular: Negative for chest pain, palpitations and leg swelling.   Gastrointestinal: Negative for abdominal distention, abdominal pain, blood in stool,  constipation, diarrhea, nausea, rectal pain and vomiting.   Endocrine: Negative for cold intolerance and heat intolerance.   Genitourinary: Negative for difficulty urinating, dysuria, frequency and urgency.   Musculoskeletal: Negative for arthralgias, back pain, gait problem, joint swelling and myalgias.   Skin: Negative for rash.   Neurological: Negative for dizziness, tremors, syncope, weakness, light-headedness, numbness and headaches.   Hematological: Negative for adenopathy. Does not bruise/bleed easily.   Psychiatric/Behavioral: Negative for confusion, sleep disturbance and suicidal ideas. The patient is not nervous/anxious.        Past Medical History:   Diagnosis Date   • Anxiety    • Bowel trouble    • Occasional tremors 11/26/2018       Social History     Socioeconomic History   • Marital status: Single     Spouse name: Not on file   • Number of children: Not on file   • Years of education: Not on file   • Highest education level: Not on file   Tobacco Use   • Smoking status: Never Smoker   • Smokeless tobacco: Never Used   Substance and Sexual Activity   • Alcohol use: Yes     Frequency: Never     Comment: occasionally   • Drug use: No   • Sexual activity: Defer       Family History   Problem Relation Age of Onset   • No Known Problems Mother    • No Known Problems Father        Past Surgical History:   Procedure Laterality Date   • ADENOIDECTOMY     • TONSILLECTOMY     • WISDOM TOOTH EXTRACTION         Allergies   Allergen Reactions   • Penicillins Other (See Comments)     Unknown            Current Outpatient Medications:   •  busPIRone (BUSPAR) 5 MG tablet, Take 5 mg by mouth 2 (Two) Times a Day., Disp: , Rfl:   •  cetirizine (zyrTEC) 10 MG tablet, Take 1 tablet by mouth Daily., Disp: 30 tablet, Rfl: 2  •  cholecalciferol (VITAMIN D3) 25 MCG (1000 UT) tablet, Take 1,000 Units by mouth Daily., Disp: , Rfl:   •  clonazePAM (KlonoPIN) 0.5 MG tablet, Take 0.5 mg by mouth 2 (Two) Times a Day As Needed for  "Seizures., Disp: , Rfl:   •  cyclobenzaprine (FLEXERIL) 10 MG tablet, Take 1 tablet by mouth 3 (Three) Times a Day As Needed for Muscle Spasms., Disp: 60 tablet, Rfl: 0  •  levonorgestrel (KYLEENA) 19.5 MG intrauterine device IUD, 1 each by Intrauterine route 1 (One) Time., Disp: , Rfl:   •  Multiple Vitamin (MULTI-VITAMIN DAILY PO), Take  by mouth., Disp: , Rfl:   •  Probiotic Product (PROBIOTIC PO), Take  by mouth., Disp: , Rfl:   •  venlafaxine (EFFEXOR) 37.5 MG tablet, Take 37.5 mg by mouth Daily., Disp: , Rfl:   •  fluticasone (FLONASE) 50 MCG/ACT nasal spray, Administer 2 sprays in each nostril daily., Disp: 1 bottle, Rfl: 5    PHYSICAL EXAMINATION:   /79   Pulse 104   Temp 97.7 °F (36.5 °C) (Temporal)   Resp 16   Ht 167.6 cm (65.98\")   Wt 69.9 kg (154 lb)   SpO2 96%   BMI 24.87 kg/m²   Pain Score    09/14/20 0930   PainSc: 0-No pain       ECOG Performance Status: 1 - Symptomatic but completely ambulatory  General Appearance:  alert, cooperative, no apparent distress and appears stated age   Neurologic/Psychiatric: A&O x 3, gait steady, appropriate affect, strength 5/5 in all muscle groups   HEENT:  Normocephalic, without obvious abnormality, mucous membranes moist   Neck: Supple, symmetrical, trachea midline, no adenopathy;  No thyromegaly, masses, or tenderness   Lungs:   Clear to auscultation bilaterally; respirations regular, even, and unlabored bilaterally   Heart:  Regular rate and rhythm, no murmurs appreciated   Abdomen:   Soft, non-tender, non-distended and no organomegaly   Lymph nodes: No cervical, supraclavicular, inguinal or axillary adenopathy noted   Extremities: Normal, atraumatic; no clubbing, cyanosis, or edema    Skin: No rashes, ulcers, or suspicious lesions noted       Consult on 09/14/2020   Component Date Value Ref Range Status   • WBC 09/14/2020 3.20* 3.40 - 10.80 10*3/mm3 Final   • RBC 09/14/2020 4.20  3.77 - 5.28 10*6/mm3 Final   • Hemoglobin 09/14/2020 14.1  12.0 - 15.9 " g/dL Final   • Hematocrit 09/14/2020 40.9  34.0 - 46.6 % Final   • RDW 09/14/2020 11.9* 12.3 - 15.4 % Final   • MCV 09/14/2020 97.5* 79.0 - 97.0 fL Final   • MCH 09/14/2020 33.5* 26.6 - 33.0 pg Final   • MCHC 09/14/2020 34.3  31.5 - 35.7 g/dL Final   • MPV 09/14/2020 7.5  6.0 - 12.0 fL Final   • Platelets 09/14/2020 159  140 - 450 10*3/mm3 Final   • Neutrophil % 09/14/2020 33.6* 42.7 - 76.0 % Final   • Lymphocyte % 09/14/2020 53.3* 19.6 - 45.3 % Final   • Monocyte % 09/14/2020 13.1* 5.0 - 12.0 % Final   • Neutrophils, Absolute 09/14/2020 1.10* 1.70 - 7.00 10*3/mm3 Final   • Lymphocytes, Absolute 09/14/2020 1.70  0.70 - 3.10 10*3/mm3 Final   • Monocytes, Absolute 09/14/2020 0.40  0.10 - 0.90 10*3/mm3 Final   Lab on 09/11/2020   Component Date Value Ref Range Status   • Glucose 09/11/2020 90  65 - 99 mg/dL Final   • BUN 09/11/2020 7  6 - 20 mg/dL Final   • Creatinine 09/11/2020 0.71  0.57 - 1.00 mg/dL Final   • Sodium 09/11/2020 139  136 - 145 mmol/L Final   • Potassium 09/11/2020 4.9  3.5 - 5.2 mmol/L Final    Specimen hemolyzed.  Results may be affected.   • Chloride 09/11/2020 103  98 - 107 mmol/L Final   • CO2 09/11/2020 26.6  22.0 - 29.0 mmol/L Final   • Calcium 09/11/2020 9.9  8.6 - 10.5 mg/dL Final   • Total Protein 09/11/2020 7.4  6.0 - 8.5 g/dL Final   • Albumin 09/11/2020 3.80  3.50 - 5.20 g/dL Final   • ALT (SGPT) 09/11/2020 230* 1 - 33 U/L Final    Specimen hemolyzed.  Results may be affected.   • AST (SGOT) 09/11/2020 209* 1 - 32 U/L Final    Specimen hemolyzed.  Results may be affected.   • Alkaline Phosphatase 09/11/2020 174* 39 - 117 U/L Final   • Total Bilirubin 09/11/2020 0.9  0.0 - 1.2 mg/dL Final   • eGFR Non African Amer 09/11/2020 101  >60 mL/min/1.73 Final   • Globulin 09/11/2020 3.6  gm/dL Final   • A/G Ratio 09/11/2020 1.1  g/dL Final   • BUN/Creatinine Ratio 09/11/2020 9.9  7.0 - 25.0 Final   • Anion Gap 09/11/2020 9.4  5.0 - 15.0 mmol/L Final   • Hepatitis B Surface Ag 09/11/2020  Non-Reactive  Non-Reactive Final   • Hep A IgM 09/11/2020 Non-Reactive  Non-Reactive Final   • Hep B C IgM 09/11/2020 Non-Reactive  Non-Reactive Final   • Hepatitis C Ab 09/11/2020 Non-Reactive  Non-Reactive Final   • Extra Tube 09/11/2020 Hold for add-ons.   Final    Auto resulted.   • WBC 09/11/2020 2.32* 3.40 - 10.80 10*3/mm3 Final   • RBC 09/11/2020 4.63  3.77 - 5.28 10*6/mm3 Final   • Hemoglobin 09/11/2020 15.8  12.0 - 15.9 g/dL Final   • Hematocrit 09/11/2020 45.0  34.0 - 46.6 % Final   • MCV 09/11/2020 97.2* 79.0 - 97.0 fL Final   • MCH 09/11/2020 34.1* 26.6 - 33.0 pg Final   • MCHC 09/11/2020 35.1  31.5 - 35.7 g/dL Final   • RDW 09/11/2020 11.4* 12.3 - 15.4 % Final   • RDW-SD 09/11/2020 40.7  37.0 - 54.0 fl Final   • MPV 09/11/2020 11.2  6.0 - 12.0 fL Final   • Platelets 09/11/2020 121* 140 - 450 10*3/mm3 Final   • Neutrophil % 09/11/2020 37.5* 42.7 - 76.0 % Final   • Lymphocyte % 09/11/2020 51.0* 19.6 - 45.3 % Final   • Monocyte % 09/11/2020 7.3  5.0 - 12.0 % Final   • Atypical Lymphocyte % 09/11/2020 4.2  0.0 - 5.0 % Final   • Neutrophils Absolute 09/11/2020 0.87* 1.70 - 7.00 10*3/mm3 Final   • Lymphocytes Absolute 09/11/2020 1.18  0.70 - 3.10 10*3/mm3 Final   • Monocytes Absolute 09/11/2020 0.17  0.10 - 0.90 10*3/mm3 Final   • Poikilocytes 09/11/2020 Slight/1+  None Seen Final   • WBC Morphology 09/11/2020 Normal  Normal Final   • Platelet Morphology 09/11/2020 Normal  Normal Final   Office Visit on 09/10/2020   Component Date Value Ref Range Status   • Glucose 09/10/2020 96  65 - 99 mg/dL Final   • BUN 09/10/2020 6  6 - 20 mg/dL Final   • Creatinine 09/10/2020 0.57  0.57 - 1.00 mg/dL Final   • Sodium 09/10/2020 136  136 - 145 mmol/L Final   • Potassium 09/10/2020 3.8  3.5 - 5.2 mmol/L Final    Slight hemolysis detected by analyzer. Results may be affected.   • Chloride 09/10/2020 103  98 - 107 mmol/L Final   • CO2 09/10/2020 22.3  22.0 - 29.0 mmol/L Final   • Calcium 09/10/2020 9.3  8.6 - 10.5 mg/dL  Final   • Total Protein 09/10/2020 6.4  6.0 - 8.5 g/dL Final   • Albumin 09/10/2020 4.40  3.50 - 5.20 g/dL Final   • ALT (SGPT) 09/10/2020 210* 1 - 33 U/L Final   • AST (SGOT) 09/10/2020 169* 1 - 32 U/L Final    Slight hemolysis detected by analyzer. Results may be affected.   • Alkaline Phosphatase 09/10/2020 154* 39 - 117 U/L Final   • Total Bilirubin 09/10/2020 0.6  0.0 - 1.2 mg/dL Final   • eGFR Non African Amer 09/10/2020 130  >60 mL/min/1.73 Final   • Globulin 09/10/2020 2.0  gm/dL Final   • A/G Ratio 09/10/2020 2.2  g/dL Final   • BUN/Creatinine Ratio 09/10/2020 10.5  7.0 - 25.0 Final   • Anion Gap 09/10/2020 10.7  5.0 - 15.0 mmol/L Final   • EBV VCA IgM 09/10/2020 <36.0  0.0 - 35.9 U/mL Final                                     Negative        <36.0                                   Equivocal 36.0 - 43.9                                   Positive        >43.9   • EBV VCA IgG 09/10/2020 <18.0  0.0 - 17.9 U/mL Final                                     Negative        <18.0                                   Equivocal 18.0 - 21.9                                   Positive        >21.9   • EBV Nuclear Antigen Ab, IgG 09/10/2020 <18.0  0.0 - 17.9 U/mL Final                                     Negative        <18.0                                   Equivocal 18.0 - 21.9                                   Positive        >21.9   • Interpretation 09/10/2020 Comment   Final                   EBV Interpretation Chart  Key: Antibody Present +    Antibody Absent -  Interpretation             VCA-IgM   VCA-IgG  EBNA-IgG  No previous infection/        -         -         -  Susceptible  Primary infection (new        +         +         -  or recent)  Past Infection               +or-       +         +  See comment below*            +         -         -  *Results indicate infection with EBV at some time   however cannot predict the timing of the infection   since antibodies to EBNA usually develop after   primary infection  or, alternatively, approximately   5-10% of patients with EBV never develop antibodies   to EBNA.   • WBC 09/10/2020 1.81* 3.40 - 10.80 10*3/mm3 Final   • RBC 09/10/2020 4.40  3.77 - 5.28 10*6/mm3 Final   • Hemoglobin 09/10/2020 15.2  12.0 - 15.9 g/dL Final   • Hematocrit 09/10/2020 43.5  34.0 - 46.6 % Final   • MCV 09/10/2020 98.9* 79.0 - 97.0 fL Final   • MCH 09/10/2020 34.5* 26.6 - 33.0 pg Final   • MCHC 09/10/2020 34.9  31.5 - 35.7 g/dL Final   • RDW 09/10/2020 11.6* 12.3 - 15.4 % Final   • RDW-SD 09/10/2020 42.9  37.0 - 54.0 fl Final   • MPV 09/10/2020 10.8  6.0 - 12.0 fL Final   • Platelets 09/10/2020 126* 140 - 450 10*3/mm3 Final   • Neutrophil % 09/10/2020 69.7  42.7 - 76.0 % Final   • Lymphocyte % 09/10/2020 27.3  19.6 - 45.3 % Final   • Monocyte % 09/10/2020 3.0* 5.0 - 12.0 % Final   • Neutrophils Absolute 09/10/2020 1.26* 1.70 - 7.00 10*3/mm3 Final   • Lymphocytes Absolute 09/10/2020 0.49* 0.70 - 3.10 10*3/mm3 Final   • Monocytes Absolute 09/10/2020 0.05* 0.10 - 0.90 10*3/mm3 Final   • RBC Morphology 09/10/2020 Normal  Normal Final   • WBC Morphology 09/10/2020 Normal  Normal Final   • Platelet Morphology 09/10/2020 Normal  Normal Final        No results found.      DIAGNOSTIC DATA:   1. Radiology:    EXAMINATION: CT HEAD WO CONTRAST- 05/26/2020     INDICATION: head injury; headache; head, trauma 2 days ago with loss of  consciousness     TECHNIQUE: Multiple axial CT imaging was obtained of the head from skull  base to skull vertex without the administration of intravenous contrast.     The radiation dose reduction device was turned on for each scan per the  ALARA (As Low as Reasonably Achievable) protocol.     COMPARISON: 10/25/2016     FINDINGS: Brain parenchyma is unremarkable in appearance. No hemorrhage  or hydrocephalus. No mass, mass effect, or midline shift. No abnormal  extra-axial fluid collections identified. Bony structures reveal no  evidence of osseous abnormality. The visualized paranasal  sinuses are  clear. Mastoid air cells are patent.     IMPRESSION:  No acute intracranial abnormality.     D:  05/26/2020  E:  05/26/2020        This report was finalized on 5/27/2020 5:04 PM by Dr. Angle Nichols MD.    2. Dr. Cano's note reviewed by me and documented in the  chart.   3. Pathology report: None available  4. Laboratory data: As above    ASSESSMENT: The patient is a very pleasant 25 y.o.  female  with pancytopenia    PROBLEM LIST:   1.  Pancytopenia:  A.  Presented with upper airway infection September 9, 2020  2.  Elevated liver enzymes  3.  Anxiety with depression      PLAN:   1. I had a long discussion today with the patient and her mother about her  new diagnosis of pancytopenia. I reviewed the patient's documents including refereing provider's notes, lab results, and imaging report.   2.  I am suspecting her pancytopenia is induced by acute infection possibly viral illness with bone marrow suppression.  3.  I will repeat the patient's CBC today we will check inflammatory markers as well as autoimmune markers.  4.  If everything comes back normal the patient will follow with me on as-needed basis in the other hand if she can to have pancytopenia she will follow-up with me in 2 months with repeat CBC.  If she has recovered by then we will consider doing bone marrow biopsy.  Rubi Mccracken MD  9/14/2020

## 2020-09-14 NOTE — TELEPHONE ENCOUNTER
Pt and her mother called regarding her white count. Pt was told that because of low WBC she should avoid people and germs as much as possible, and they wanted to get more clarification on that so they have a better idea of what is safe and what is not.    Please call pt at [phone]

## 2020-09-15 LAB — ANA SER QL IA: NEGATIVE

## 2021-02-03 DIAGNOSIS — M62.838 MUSCLE SPASM: ICD-10-CM

## 2021-02-04 RX ORDER — CYCLOBENZAPRINE HCL 10 MG
TABLET ORAL
Qty: 60 TABLET | Refills: 0 | Status: SHIPPED | OUTPATIENT
Start: 2021-02-04

## 2022-11-10 ENCOUNTER — OFFICE (OUTPATIENT)
Dept: URBAN - METROPOLITAN AREA CLINIC 4 | Facility: CLINIC | Age: 27
End: 2022-11-10

## 2022-11-10 VITALS — SYSTOLIC BLOOD PRESSURE: 119 MMHG | WEIGHT: 174 LBS | HEIGHT: 66 IN | DIASTOLIC BLOOD PRESSURE: 78 MMHG

## 2022-11-10 DIAGNOSIS — R15.2 FECAL URGENCY: ICD-10-CM

## 2022-11-10 DIAGNOSIS — R14.0 ABDOMINAL DISTENSION (GASEOUS): ICD-10-CM

## 2022-11-10 DIAGNOSIS — K52.9 NONINFECTIVE GASTROENTERITIS AND COLITIS, UNSPECIFIED: ICD-10-CM

## 2022-11-10 PROCEDURE — 99204 OFFICE O/P NEW MOD 45 MIN: CPT | Performed by: INTERNAL MEDICINE

## 2023-02-07 ENCOUNTER — OFFICE (OUTPATIENT)
Dept: URBAN - METROPOLITAN AREA CLINIC 4 | Facility: CLINIC | Age: 28
End: 2023-02-07

## 2023-02-07 VITALS — SYSTOLIC BLOOD PRESSURE: 124 MMHG | WEIGHT: 171 LBS | HEIGHT: 66 IN | DIASTOLIC BLOOD PRESSURE: 79 MMHG

## 2023-02-07 DIAGNOSIS — R63.4 ABNORMAL WEIGHT LOSS: ICD-10-CM

## 2023-02-07 DIAGNOSIS — R15.2 FECAL URGENCY: ICD-10-CM

## 2023-02-07 DIAGNOSIS — K63.89 OTHER SPECIFIED DISEASES OF INTESTINE: ICD-10-CM

## 2023-02-07 DIAGNOSIS — E74.31 SUCRASE-ISOMALTASE DEFICIENCY: ICD-10-CM

## 2023-02-07 DIAGNOSIS — K58.0 IRRITABLE BOWEL SYNDROME WITH DIARRHEA: ICD-10-CM

## 2023-02-07 DIAGNOSIS — R14.0 ABDOMINAL DISTENSION (GASEOUS): ICD-10-CM

## 2023-02-07 DIAGNOSIS — R68.81 EARLY SATIETY: ICD-10-CM

## 2023-02-07 DIAGNOSIS — R19.8 OTHER SPECIFIED SYMPTOMS AND SIGNS INVOLVING THE DIGESTIVE S: ICD-10-CM

## 2023-02-07 PROCEDURE — 99214 OFFICE O/P EST MOD 30 MIN: CPT | Performed by: INTERNAL MEDICINE

## 2024-05-12 ENCOUNTER — TELEMEDICINE (OUTPATIENT)
Dept: FAMILY MEDICINE CLINIC | Facility: TELEHEALTH | Age: 29
End: 2024-05-12
Payer: COMMERCIAL

## 2024-05-12 VITALS — HEART RATE: 86 BPM | TEMPERATURE: 98.9 F

## 2024-05-12 DIAGNOSIS — J02.9 ACUTE PHARYNGITIS, UNSPECIFIED ETIOLOGY: ICD-10-CM

## 2024-05-12 DIAGNOSIS — J06.9 ACUTE URI: Primary | ICD-10-CM

## 2024-05-12 PROBLEM — J30.1 ALLERGIC RHINITIS DUE TO POLLEN: Status: ACTIVE | Noted: 2024-03-09

## 2024-05-12 PROBLEM — F43.10 PTSD (POST-TRAUMATIC STRESS DISORDER): Status: ACTIVE | Noted: 2023-03-03

## 2024-05-12 PROBLEM — T78.40XA ALLERGIES: Status: ACTIVE | Noted: 2018-01-16

## 2024-05-12 PROBLEM — K58.2 IRRITABLE BOWEL SYNDROME WITH BOTH CONSTIPATION AND DIARRHEA: Status: ACTIVE | Noted: 2023-03-03

## 2024-05-12 PROBLEM — F32.A DEPRESSION: Status: ACTIVE | Noted: 2023-03-03

## 2024-05-12 RX ORDER — METHYLPREDNISOLONE 4 MG/1
TABLET ORAL
Qty: 21 TABLET | Refills: 0 | Status: SHIPPED | OUTPATIENT
Start: 2024-05-12

## 2024-05-12 RX ORDER — VENLAFAXINE HYDROCHLORIDE 75 MG/1
1 CAPSULE, EXTENDED RELEASE ORAL DAILY
COMMUNITY

## 2024-05-12 RX ORDER — GUAIFENESIN 600 MG/1
600 TABLET, EXTENDED RELEASE ORAL 2 TIMES DAILY
Qty: 28 TABLET | Refills: 0 | Status: SHIPPED | OUTPATIENT
Start: 2024-05-12 | End: 2024-05-26

## 2024-05-12 RX ORDER — BENZONATATE 100 MG/1
CAPSULE ORAL
Qty: 30 CAPSULE | Refills: 0 | Status: SHIPPED | OUTPATIENT
Start: 2024-05-12

## 2024-08-11 ENCOUNTER — APPOINTMENT (OUTPATIENT)
Dept: GENERAL RADIOLOGY | Facility: HOSPITAL | Age: 29
End: 2024-08-11
Payer: COMMERCIAL

## 2024-08-11 ENCOUNTER — HOSPITAL ENCOUNTER (EMERGENCY)
Facility: HOSPITAL | Age: 29
Discharge: HOME | End: 2024-08-12
Attending: EMERGENCY MEDICINE | Admitting: EMERGENCY MEDICINE
Payer: COMMERCIAL

## 2024-08-11 DIAGNOSIS — R06.02 SHORTNESS OF BREATH: ICD-10-CM

## 2024-08-11 DIAGNOSIS — J06.9 VIRAL UPPER RESPIRATORY TRACT INFECTION: Primary | ICD-10-CM

## 2024-08-11 DIAGNOSIS — M79.10 MYALGIA: ICD-10-CM

## 2024-08-11 LAB
ALBUMIN SERPL-MCNC: 3.6 G/DL (ref 3.5–5.2)
ALBUMIN/GLOB SERPL: 1.1 G/DL
ALP SERPL-CCNC: 116 U/L (ref 39–117)
ALT SERPL W P-5'-P-CCNC: 91 U/L (ref 1–33)
ANION GAP SERPL CALCULATED.3IONS-SCNC: 13 MMOL/L (ref 5–15)
AST SERPL-CCNC: 73 U/L (ref 1–32)
BILIRUB SERPL-MCNC: 1 MG/DL (ref 0–1.2)
BUN SERPL-MCNC: 18 MG/DL (ref 6–20)
BUN/CREAT SERPL: 17 (ref 7–25)
CALCIUM SPEC-SCNC: 10.3 MG/DL (ref 8.6–10.5)
CHLORIDE SERPL-SCNC: 99 MMOL/L (ref 98–107)
CO2 SERPL-SCNC: 22 MMOL/L (ref 22–29)
CREAT SERPL-MCNC: 1.06 MG/DL (ref 0.57–1)
D DIMER PPP FEU-MCNC: 0.52 MCGFEU/ML (ref 0–0.5)
EGFRCR SERPLBLD CKD-EPI 2021: 73.5 ML/MIN/1.73
GLOBULIN UR ELPH-MCNC: 3.2 GM/DL
GLUCOSE SERPL-MCNC: 104 MG/DL (ref 65–99)
NT-PROBNP SERPL-MCNC: 116.7 PG/ML (ref 0–450)
POTASSIUM SERPL-SCNC: 3.8 MMOL/L (ref 3.5–5.2)
PROT SERPL-MCNC: 6.8 G/DL (ref 6–8.5)
SODIUM SERPL-SCNC: 134 MMOL/L (ref 136–145)
TROPONIN T SERPL HS-MCNC: <6 NG/L

## 2024-08-11 PROCEDURE — 71045 X-RAY EXAM CHEST 1 VIEW: CPT

## 2024-08-11 PROCEDURE — 84484 ASSAY OF TROPONIN QUANT: CPT | Performed by: EMERGENCY MEDICINE

## 2024-08-11 PROCEDURE — 93005 ELECTROCARDIOGRAM TRACING: CPT | Performed by: EMERGENCY MEDICINE

## 2024-08-11 PROCEDURE — 85025 COMPLETE CBC W/AUTO DIFF WBC: CPT | Performed by: EMERGENCY MEDICINE

## 2024-08-11 PROCEDURE — 85007 BL SMEAR W/DIFF WBC COUNT: CPT | Performed by: EMERGENCY MEDICINE

## 2024-08-11 PROCEDURE — 25010000002 DIPHENHYDRAMINE PER 50 MG: Performed by: EMERGENCY MEDICINE

## 2024-08-11 PROCEDURE — 25010000002 KETOROLAC TROMETHAMINE PER 15 MG: Performed by: EMERGENCY MEDICINE

## 2024-08-11 PROCEDURE — 96375 TX/PRO/DX INJ NEW DRUG ADDON: CPT

## 2024-08-11 PROCEDURE — 25810000003 SODIUM CHLORIDE 0.9 % SOLUTION: Performed by: EMERGENCY MEDICINE

## 2024-08-11 PROCEDURE — 87637 SARSCOV2&INF A&B&RSV AMP PRB: CPT | Performed by: EMERGENCY MEDICINE

## 2024-08-11 PROCEDURE — 83880 ASSAY OF NATRIURETIC PEPTIDE: CPT | Performed by: EMERGENCY MEDICINE

## 2024-08-11 PROCEDURE — 85379 FIBRIN DEGRADATION QUANT: CPT | Performed by: EMERGENCY MEDICINE

## 2024-08-11 PROCEDURE — 93005 ELECTROCARDIOGRAM TRACING: CPT

## 2024-08-11 PROCEDURE — 99285 EMERGENCY DEPT VISIT HI MDM: CPT

## 2024-08-11 PROCEDURE — 80053 COMPREHEN METABOLIC PANEL: CPT | Performed by: EMERGENCY MEDICINE

## 2024-08-11 PROCEDURE — 96374 THER/PROPH/DIAG INJ IV PUSH: CPT

## 2024-08-11 RX ORDER — KETOROLAC TROMETHAMINE 30 MG/ML
30 INJECTION, SOLUTION INTRAMUSCULAR; INTRAVENOUS ONCE
Status: COMPLETED | OUTPATIENT
Start: 2024-08-11 | End: 2024-08-11

## 2024-08-11 RX ORDER — DIPHENHYDRAMINE HYDROCHLORIDE 50 MG/ML
25 INJECTION INTRAMUSCULAR; INTRAVENOUS ONCE
Status: COMPLETED | OUTPATIENT
Start: 2024-08-11 | End: 2024-08-11

## 2024-08-11 RX ORDER — SODIUM CHLORIDE 0.9 % (FLUSH) 0.9 %
10 SYRINGE (ML) INJECTION AS NEEDED
Status: DISCONTINUED | OUTPATIENT
Start: 2024-08-11 | End: 2024-08-12 | Stop reason: HOSPADM

## 2024-08-11 RX ADMIN — KETOROLAC TROMETHAMINE 30 MG: 30 INJECTION, SOLUTION INTRAMUSCULAR; INTRAVENOUS at 23:12

## 2024-08-11 RX ADMIN — DIPHENHYDRAMINE HYDROCHLORIDE 25 MG: 50 INJECTION INTRAMUSCULAR; INTRAVENOUS at 23:12

## 2024-08-11 RX ADMIN — SODIUM CHLORIDE 1000 ML: 9 INJECTION, SOLUTION INTRAVENOUS at 23:13

## 2024-08-12 ENCOUNTER — APPOINTMENT (OUTPATIENT)
Dept: CT IMAGING | Facility: HOSPITAL | Age: 29
End: 2024-08-12
Payer: COMMERCIAL

## 2024-08-12 VITALS
TEMPERATURE: 98.6 F | WEIGHT: 149.91 LBS | OXYGEN SATURATION: 98 % | RESPIRATION RATE: 24 BRPM | HEIGHT: 66 IN | DIASTOLIC BLOOD PRESSURE: 78 MMHG | BODY MASS INDEX: 24.09 KG/M2 | HEART RATE: 108 BPM | SYSTOLIC BLOOD PRESSURE: 121 MMHG

## 2024-08-12 LAB
BACTERIA UR QL AUTO: ABNORMAL /HPF
BASOPHILS # BLD AUTO: 0.03 10*3/MM3 (ref 0–0.2)
BASOPHILS NFR BLD AUTO: 0.2 % (ref 0–1.5)
BILIRUB UR QL STRIP: NEGATIVE
CLARITY UR: CLEAR
COLOR UR: YELLOW
DEPRECATED RDW RBC AUTO: 40.7 FL (ref 37–54)
EOSINOPHIL # BLD AUTO: 0 10*3/MM3 (ref 0–0.4)
EOSINOPHIL NFR BLD AUTO: 0 % (ref 0.3–6.2)
ERYTHROCYTE [DISTWIDTH] IN BLOOD BY AUTOMATED COUNT: 11.5 % (ref 12.3–15.4)
FLUAV RNA RESP QL NAA+PROBE: NOT DETECTED
FLUBV RNA RESP QL NAA+PROBE: NOT DETECTED
GLUCOSE UR STRIP-MCNC: ABNORMAL MG/DL
HCT VFR BLD AUTO: 39.1 % (ref 34–46.6)
HGB BLD-MCNC: 13.7 G/DL (ref 12–15.9)
HGB UR QL STRIP.AUTO: ABNORMAL
HYALINE CASTS UR QL AUTO: ABNORMAL /LPF
IMM GRANULOCYTES # BLD AUTO: 0.82 10*3/MM3 (ref 0–0.05)
IMM GRANULOCYTES NFR BLD AUTO: 4.7 % (ref 0–0.5)
KETONES UR QL STRIP: NEGATIVE
LEUKOCYTE ESTERASE UR QL STRIP.AUTO: ABNORMAL
LYMPHOCYTES # BLD AUTO: 1.34 10*3/MM3 (ref 0.7–3.1)
LYMPHOCYTES NFR BLD AUTO: 7.6 % (ref 19.6–45.3)
MCH RBC QN AUTO: 33.6 PG (ref 26.6–33)
MCHC RBC AUTO-ENTMCNC: 35 G/DL (ref 31.5–35.7)
MCV RBC AUTO: 95.8 FL (ref 79–97)
MONOCYTES # BLD AUTO: 2.38 10*3/MM3 (ref 0.1–0.9)
MONOCYTES NFR BLD AUTO: 13.5 % (ref 5–12)
NEUTROPHILS NFR BLD AUTO: 13.01 10*3/MM3 (ref 1.7–7)
NEUTROPHILS NFR BLD AUTO: 74 % (ref 42.7–76)
NITRITE UR QL STRIP: NEGATIVE
NRBC BLD AUTO-RTO: 0 /100 WBC (ref 0–0.2)
PH UR STRIP.AUTO: 7 [PH] (ref 5–8)
PLAT MORPH BLD: NORMAL
PLATELET # BLD AUTO: 204 10*3/MM3 (ref 140–450)
PMV BLD AUTO: 10.4 FL (ref 6–12)
PROT UR QL STRIP: ABNORMAL
RBC # BLD AUTO: 4.08 10*6/MM3 (ref 3.77–5.28)
RBC # UR STRIP: ABNORMAL /HPF
RBC MORPH BLD: NORMAL
REF LAB TEST METHOD: ABNORMAL
RSV RNA RESP QL NAA+PROBE: NOT DETECTED
SARS-COV-2 RNA RESP QL NAA+PROBE: NOT DETECTED
SP GR UR STRIP: 1.01 (ref 1–1.03)
SQUAMOUS #/AREA URNS HPF: ABNORMAL /HPF
UROBILINOGEN UR QL STRIP: ABNORMAL
WBC # UR STRIP: ABNORMAL /HPF
WBC MORPH BLD: NORMAL
WBC NRBC COR # BLD AUTO: 17.58 10*3/MM3 (ref 3.4–10.8)

## 2024-08-12 PROCEDURE — 71275 CT ANGIOGRAPHY CHEST: CPT

## 2024-08-12 PROCEDURE — 81001 URINALYSIS AUTO W/SCOPE: CPT | Performed by: EMERGENCY MEDICINE

## 2024-08-12 PROCEDURE — 25510000001 IOPAMIDOL PER 1 ML: Performed by: EMERGENCY MEDICINE

## 2024-08-12 RX ADMIN — IOPAMIDOL 80 ML: 755 INJECTION, SOLUTION INTRAVENOUS at 00:39

## 2024-08-12 NOTE — ED PROVIDER NOTES
Subjective   History of Present Illness  Is a 29-year-old female presented to the emergency department with some full body myalgias and difficulty breathing.  The patient has had some flulike symptoms for the last 3 days.  She states that she went to urgent treatment center and she was placed on antibiotics secondary to pneumonia.  Patient states that she is not getting any better.  She is complaining of muscle pains all throughout her body.  She feels like she cannot breathe.  She has had a mild cough, however is nonproductive in nature.  Denies any fevers or chills.  No headache.  No neck pain.  No vomiting or diarrhea.    History provided by:  Patient   used: No        Review of Systems   Constitutional:  Positive for fatigue. Negative for chills and fever.   HENT:  Negative for congestion, ear pain and sore throat.    Eyes:  Negative for visual disturbance.   Respiratory:  Positive for cough and shortness of breath.    Cardiovascular:  Negative for chest pain.   Gastrointestinal:  Negative for abdominal pain.   Genitourinary:  Negative for difficulty urinating.   Musculoskeletal:  Positive for myalgias. Negative for arthralgias.   Skin:  Negative for rash.   Neurological:  Negative for dizziness, weakness and numbness.   Psychiatric/Behavioral:  Negative for agitation.        Past Medical History:   Diagnosis Date    Anxiety     Bowel trouble     Nightmares     Occasional tremors 11/26/2018       Allergies   Allergen Reactions    Cephalosporins Other (See Comments)     Cephalosporins    Penicillins Other (See Comments)     Unknown         Past Surgical History:   Procedure Laterality Date    ADENOIDECTOMY      TONSILLECTOMY      WISDOM TOOTH EXTRACTION         Family History   Problem Relation Age of Onset    No Known Problems Mother     No Known Problems Father        Social History     Socioeconomic History    Marital status: Single   Tobacco Use    Smoking status: Never     Passive  exposure: Never    Smokeless tobacco: Never   Vaping Use    Vaping status: Never Used   Substance and Sexual Activity    Alcohol use: Yes     Comment: occasionally    Drug use: No    Sexual activity: Defer           Objective   Physical Exam  Vitals and nursing note reviewed.   Constitutional:       General: She is not in acute distress.     Appearance: She is not ill-appearing or toxic-appearing.   HENT:      Mouth/Throat:      Pharynx: No posterior oropharyngeal erythema.   Eyes:      Conjunctiva/sclera: Conjunctivae normal.      Pupils: Pupils are equal, round, and reactive to light.   Cardiovascular:      Rate and Rhythm: Regular rhythm. Tachycardia present.   Pulmonary:      Effort: Pulmonary effort is normal. No respiratory distress.   Abdominal:      General: Abdomen is flat. There is no distension.      Palpations: There is no mass.      Tenderness: There is no abdominal tenderness. There is no guarding or rebound.   Musculoskeletal:         General: No deformity. Normal range of motion.   Skin:     General: Skin is warm.      Findings: No rash.   Neurological:      General: No focal deficit present.      Mental Status: She is alert and oriented to person, place, and time.      Motor: No weakness.         ECG 12 Lead      Date/Time: 8/11/2024 10:43 PM    Performed by: Ronni Merritt MD  Authorized by: Ronni Merritt MD  Interpreted by ED physician  Previous ECG: no previous ECG available  Rhythm: sinus tachycardia  Rate: tachycardic  BPM: 125  QRS axis: normal  Conduction: conduction normal  ST Segments: ST segments normal  Clinical impression: normal ECG  Comments: Interpretation:  EKG was directly visualized by myself, interpretations as documented in hospital course.               ED Course  ED Course as of 08/12/24 0135   Sun Aug 11, 2024   2245 BP: 121/78 [JK]   2245 Temp: 98.6 °F (37 °C) [JK]   2245 Temp src: Oral [JK]   2245 Heart Rate(!): 128 [JK]   2245 Resp: 24 [JK]   2245 SpO2: 100 %  [JK]   2245 Device (Oxygen Therapy): room air  Interpretation:  Patient's repeat vitals, telemetry tracing, and pulse oximetry tracing were directly viewed and interpreted by myself.   O2 sat 100% on room air, interpreted as normal  Telemetry rhythm strip revealed a rate of 128 bpm, interpreted as sinus tachycardia [JK]   2353 Comprehensive Metabolic Panel(!) [JK]   2353 D-dimer, Quantitative(!) [JK]   2353 Single High Sensitivity Troponin T [JK]   2353 BNP [JK]   2353 CBC & Differential(!)  Interpretation:  Laboratory studies were reviewed and interpreted directly by myself.  CBC showed some leukocytosis with white blood cell count of 17.58, CMP showed some minor elevation in ALT at 91 and AST at 73, D-dimer is elevated 0.52, troponin normal, BNP normal [JK]   Mon Aug 12, 2024   0131 CT Angiogram Chest [JK]   0131 XR Chest 1 View  Interpretation:  Imaging was directly visualized by myself, per my interpretations, chest x-ray was unremarkable.  CT angiogram of the chest did not show any issues in the thoracic cavity.  She did have some abnormal enhancement in the kidney. [JK]   0132 On reevaluation, the patient is feeling much better.  Workup is relatively benign.  I did have a discussion with the patient regarding the enhancement in her kidneys.  She does not have any urinary symptoms or abdominal pain.  She will need follow-up with her PCP in order to reimage and reevaluate the area.  Patient is agreeable.  Declining CT of the abdomen at this time. [JK]   0134 Patient states that she would like to be discharged home to get home and rest.  I did explain that we will send off her urine and follow-up on it.  I will send an antibiotic if needed.  However she is currently on doxycycline.  I instructed her to continue taking this.  She has a follow-up with her PCP in 48 hours.  Given strict return precautions.  Verbalized understanding. [JK]   0134 I had a discussion with the patient/family regarding diagnosis,  diagnostic results, treatment plan, and medications. The patient/family indicated understanding of these instructions. I spent adequate time at the bedside prior to discharge necessary to discuss the aftercare instructions, giving patient education, providing explanations of the results of our evaluations/findings, and my decision making to assure that the patient/family understand the plan of care. Time was allotted to answer questions at that time and throughout the ED course. Patient is required to maintain timely follow up, as discussed. I also discussed the potential for the development of an acute emergent condition requiring further evaluation, return to the ER, admission, or even surgical intervention.  I encouraged the patient to return to the emergency department immediately for any concerns, worsening symptoms, new complaints, or if symptoms persist and they are unable to seek follow-up in a timely fashion. The patient/family expressed understanding and agreement with this plan    Shared decision making:   After full review of the patient's clinical presentation, review of any work-up including but not limited to laboratory studies and radiology obtained, I had a discussion with the patient.  Treatment options were discussed as well as the risks, benefits and consequences.  I discussed all findings with the patient and family members if available.  During the discussion, treatment goals were understood by all as well as any misconceptions which were addressed with the patient.  Ample time was given for any questions they may have had.  They are in agreement with the treatment plan as well as final disposition. [JK]      ED Course User Index  [JK] Rnoni Merritt MD                                             Medical Decision Making  This is a 29-year-old female with history of anxiety presented to the emergency department with some cough, chest pain and full body myalgias.  Patient is extremely  anxious on my initial examination.  I do believe this is contributing to the majority of her symptoms.  She states that she feels short of breath, however she has maintaining oxygen saturations at 100% on room air.  Overall, the patient is nontoxic.  Afebrile.  IV access was established and the patient.  Placed on continuous telemetry monitoring.  Given the patient's presentation, differential is broad and will require further evaluation.  Workup initiated.      Differential diagnosis: URI, bronchitis, COVID, influenza, sinusitis, viral syndrome, RSV, anxiety, pneumothorax, PE, CAD      Amount and/or Complexity of Data Reviewed  External Data Reviewed: labs and notes.     Details: External laboratories, imaging as well as notes were reviewed personally by myself.  All relevant studies were used to guide decision making.     Date of previous record: 8/11/2024    Source of note: Urgent treatment    Summary: Patient was seen for similar symptoms.  I did review based laboratory studies on file including rapid strep, mono and respiratory panel which was negative.  Records reviewed    Labs: ordered. Decision-making details documented in ED Course.  Radiology: ordered and independent interpretation performed. Decision-making details documented in ED Course.  ECG/medicine tests: ordered and independent interpretation performed. Decision-making details documented in ED Course.    Risk  Prescription drug management.        Final diagnoses:   Viral upper respiratory tract infection   Shortness of breath   Myalgia       ED Disposition  ED Disposition       ED Disposition   Discharge    Condition   Stable    Comment   --               Suni Hernandez MD  [address] [id number] [phone]    Call in 1 day           Medication List      No changes were made to your prescriptions during this visit.            Ronni Merritt MD  08/12/24 0131

## 2024-08-12 NOTE — DISCHARGE INSTRUCTIONS
Continue to take medications as directed.  Maintain adequate hydration.  Use acetaminophen or NSAIDs as discussed for pain control.  Follow-up with your primary physician or specialist within the next 24 hours.  Return to the emergency department for any change in symptoms.

## 2024-08-13 ENCOUNTER — NURSE TRIAGE (OUTPATIENT)
Dept: CALL CENTER | Facility: HOSPITAL | Age: 29
End: 2024-08-13
Payer: COMMERCIAL

## 2024-08-14 ENCOUNTER — PATIENT ROUNDING (BHMG ONLY) (OUTPATIENT)
Dept: URGENT CARE | Facility: CLINIC | Age: 29
End: 2024-08-14
Payer: COMMERCIAL

## 2024-08-14 LAB
QT INTERVAL: 288 MS
QTC INTERVAL: 415 MS

## 2024-08-14 NOTE — ED NOTES
Thank you for letting us care for you in your recent visit to our urgent care center. We would love to hear about your experience with us. Was this the first time you have visited our location?    We’re always looking for ways to make our patients’ experiences even better. Do you have any recommendations on ways we may improve?     I appreciate you taking the time to respond. Please be on the lookout for a survey about your recent visit from [employer] via text or email. We would greatly appreciate if you could fill that out and turn it back in. We want your voice to be heard and we value your feedback.   Thank you for choosing [facility] for your healthcare needs.

## 2024-08-14 NOTE — TELEPHONE ENCOUNTER
"Caller states having Right Flank Pain and Pain in Right lower abdominal area rates pain level eight with some nausea. Caller states was recently seen in ER and getting worse. Reports some Urinary frequency and some foul odor. Caller states she kept taking the ABX as told. Caller reports fever this week but not today. Caller states pain is now constant. Advised per guideline.           Reason for Disposition   [1] SEVERE pain (e.g., excruciating, scale 8-10) AND [2] present > 1 hour    Additional Information   Negative: Passed out (i.e., lost consciousness, collapsed and was not responding)   Negative: Shock suspected (e.g., cold/pale/clammy skin, too weak to stand, low BP, rapid pulse)   Negative: Difficult to awaken or acting confused (e.g., disoriented, slurred speech)   Negative: Sounds like a life-threatening emergency to the triager   Negative: Followed a major injury to the back (e.g., MVA, fall > 10 feet or 3 meters, penetrating injury, etc.)   Negative: Back pain or flank pain during pregnancy   Negative: Upper, mid or lower back pain that occurs mainly in the midline    Answer Assessment - Initial Assessment Questions  1. LOCATION: \"Where does it hurt?\" (e.g., left, right)      Right Flank and Right Abdominal Pain   2. ONSET: \"When did the pain start?\"      Since Thursday   3. SEVERITY: \"How bad is the pain?\" (e.g., Scale 1-10; mild, moderate, or severe)    - MILD (1-3): doesn't interfere with normal activities     - MODERATE (4-7): interferes with normal activities or awakens from sleep     - SEVERE (8-10): excruciating pain and patient unable to do normal activities (stays in bed)        8  4. PATTERN: \"Does the pain come and go, or is it constant?\"       Constant   5. CAUSE: \"What do you think is causing the pain?\"      UTI   6. OTHER SYMPTOMS:  \"Do you have any other symptoms?\" (e.g., fever, abdomen pain, vomiting, leg weakness, burning with urination, blood in urine)      Nausea, frequency, Foul odor, " "Fever every day but today   7. PREGNANCY:  \"Is there any chance you are pregnant?\" \"When was your last menstrual period?\"      Denies    Protocols used: Flank Pain-ADULT-AH    "